# Patient Record
Sex: MALE | Race: BLACK OR AFRICAN AMERICAN | NOT HISPANIC OR LATINO | Employment: UNEMPLOYED | URBAN - METROPOLITAN AREA
[De-identification: names, ages, dates, MRNs, and addresses within clinical notes are randomized per-mention and may not be internally consistent; named-entity substitution may affect disease eponyms.]

---

## 2019-05-10 ENCOUNTER — HOSPITAL ENCOUNTER (EMERGENCY)
Facility: HOSPITAL | Age: 19
Discharge: HOME | End: 2019-05-10
Attending: EMERGENCY MEDICINE
Payer: MEDICAID

## 2019-05-10 VITALS
TEMPERATURE: 99 F | OXYGEN SATURATION: 98 % | BODY MASS INDEX: 35.35 KG/M2 | RESPIRATION RATE: 15 BRPM | WEIGHT: 246.94 LBS | SYSTOLIC BLOOD PRESSURE: 128 MMHG | HEIGHT: 70 IN | DIASTOLIC BLOOD PRESSURE: 65 MMHG | HEART RATE: 81 BPM

## 2019-05-10 DIAGNOSIS — J06.9 URTI (ACUTE UPPER RESPIRATORY INFECTION): ICD-10-CM

## 2019-05-10 DIAGNOSIS — R50.9 FEVER, UNSPECIFIED FEVER CAUSE: Primary | ICD-10-CM

## 2019-05-10 DIAGNOSIS — R55 SYNCOPE, UNSPECIFIED SYNCOPE TYPE: ICD-10-CM

## 2019-05-10 DIAGNOSIS — M67.352 TRANSIENT SYNOVITIS OF HIP, LEFT: ICD-10-CM

## 2019-05-10 LAB
ALBUMIN SERPL BCP-MCNC: 3.9 G/DL (ref 3.5–5.2)
ALP SERPL-CCNC: 74 U/L (ref 55–135)
ALT SERPL W/O P-5'-P-CCNC: 23 U/L (ref 10–44)
ANION GAP SERPL CALC-SCNC: 8 MMOL/L (ref 8–16)
AST SERPL-CCNC: 13 U/L (ref 10–40)
BASOPHILS # BLD AUTO: 0.02 K/UL (ref 0–0.2)
BASOPHILS NFR BLD: 0.2 % (ref 0–1.9)
BILIRUB SERPL-MCNC: 1.2 MG/DL (ref 0.1–1)
BILIRUB UR QL STRIP: NEGATIVE
BUN SERPL-MCNC: 8 MG/DL (ref 6–20)
CALCIUM SERPL-MCNC: 9.2 MG/DL (ref 8.7–10.5)
CHLORIDE SERPL-SCNC: 103 MMOL/L (ref 95–110)
CLARITY UR REFRACT.AUTO: CLEAR
CO2 SERPL-SCNC: 27 MMOL/L (ref 23–29)
COLOR UR AUTO: YELLOW
CREAT SERPL-MCNC: 1.1 MG/DL (ref 0.5–1.4)
CRP SERPL-MCNC: 67.8 MG/L (ref 0–8.2)
DIFFERENTIAL METHOD: ABNORMAL
EOSINOPHIL # BLD AUTO: 0.3 K/UL (ref 0–0.5)
EOSINOPHIL NFR BLD: 2.6 % (ref 0–8)
ERYTHROCYTE [DISTWIDTH] IN BLOOD BY AUTOMATED COUNT: 13.2 % (ref 11.5–14.5)
ERYTHROCYTE [SEDIMENTATION RATE] IN BLOOD BY WESTERGREN METHOD: 36 MM/HR (ref 0–23)
EST. GFR  (AFRICAN AMERICAN): >60 ML/MIN/1.73 M^2
EST. GFR  (NON AFRICAN AMERICAN): >60 ML/MIN/1.73 M^2
GLUCOSE SERPL-MCNC: 101 MG/DL (ref 70–110)
GLUCOSE UR QL STRIP: NEGATIVE
HCT VFR BLD AUTO: 45.8 % (ref 40–54)
HGB BLD-MCNC: 15.4 G/DL (ref 14–18)
HGB UR QL STRIP: NEGATIVE
KETONES UR QL STRIP: NEGATIVE
LACTATE SERPL-SCNC: 0.9 MMOL/L (ref 0.5–2.2)
LEUKOCYTE ESTERASE UR QL STRIP: NEGATIVE
LYMPHOCYTES # BLD AUTO: 1 K/UL (ref 1–4.8)
LYMPHOCYTES NFR BLD: 7.6 % (ref 18–48)
MCH RBC QN AUTO: 27.8 PG (ref 27–31)
MCHC RBC AUTO-ENTMCNC: 33.6 G/DL (ref 32–36)
MCV RBC AUTO: 83 FL (ref 82–98)
MONOCYTES # BLD AUTO: 0.9 K/UL (ref 0.3–1)
MONOCYTES NFR BLD: 6.6 % (ref 4–15)
NEUTROPHILS # BLD AUTO: 10.7 K/UL (ref 1.8–7.7)
NEUTROPHILS NFR BLD: 82.8 % (ref 38–73)
NITRITE UR QL STRIP: NEGATIVE
PH UR STRIP: 7 [PH] (ref 5–8)
PLATELET # BLD AUTO: 219 K/UL (ref 150–350)
PMV BLD AUTO: 9.8 FL (ref 9.2–12.9)
POTASSIUM SERPL-SCNC: 3.7 MMOL/L (ref 3.5–5.1)
PROCALCITONIN SERPL IA-MCNC: 0.15 NG/ML
PROT SERPL-MCNC: 7.4 G/DL (ref 6–8.4)
PROT UR QL STRIP: NEGATIVE
RBC # BLD AUTO: 5.53 M/UL (ref 4.6–6.2)
SODIUM SERPL-SCNC: 138 MMOL/L (ref 136–145)
SP GR UR STRIP: 1.01 (ref 1–1.03)
URN SPEC COLLECT METH UR: NORMAL
UROBILINOGEN UR STRIP-ACNC: <2 EU/DL
WBC # BLD AUTO: 12.94 K/UL (ref 3.9–12.7)

## 2019-05-10 PROCEDURE — 63600175 PHARM REV CODE 636 W HCPCS: Mod: ER | Performed by: EMERGENCY MEDICINE

## 2019-05-10 PROCEDURE — 96367 TX/PROPH/DG ADDL SEQ IV INF: CPT | Mod: ER

## 2019-05-10 PROCEDURE — A9585 GADOBUTROL INJECTION: HCPCS | Mod: ER | Performed by: EMERGENCY MEDICINE

## 2019-05-10 PROCEDURE — 96366 THER/PROPH/DIAG IV INF ADDON: CPT | Mod: ER

## 2019-05-10 PROCEDURE — 25000003 PHARM REV CODE 250: Mod: ER | Performed by: EMERGENCY MEDICINE

## 2019-05-10 PROCEDURE — 86140 C-REACTIVE PROTEIN: CPT | Mod: ER

## 2019-05-10 PROCEDURE — 96375 TX/PRO/DX INJ NEW DRUG ADDON: CPT | Mod: ER

## 2019-05-10 PROCEDURE — 87040 BLOOD CULTURE FOR BACTERIA: CPT

## 2019-05-10 PROCEDURE — 80053 COMPREHEN METABOLIC PANEL: CPT | Mod: ER

## 2019-05-10 PROCEDURE — 85652 RBC SED RATE AUTOMATED: CPT

## 2019-05-10 PROCEDURE — 81003 URINALYSIS AUTO W/O SCOPE: CPT | Mod: ER

## 2019-05-10 PROCEDURE — 85025 COMPLETE CBC W/AUTO DIFF WBC: CPT | Mod: ER

## 2019-05-10 PROCEDURE — 83605 ASSAY OF LACTIC ACID: CPT | Mod: ER

## 2019-05-10 PROCEDURE — 25500020 PHARM REV CODE 255: Mod: ER | Performed by: EMERGENCY MEDICINE

## 2019-05-10 PROCEDURE — 84145 PROCALCITONIN (PCT): CPT | Mod: ER

## 2019-05-10 PROCEDURE — 99285 EMERGENCY DEPT VISIT HI MDM: CPT | Mod: 25,ER

## 2019-05-10 PROCEDURE — 96361 HYDRATE IV INFUSION ADD-ON: CPT | Mod: ER

## 2019-05-10 PROCEDURE — 96365 THER/PROPH/DIAG IV INF INIT: CPT | Mod: ER

## 2019-05-10 RX ORDER — SERTRALINE HYDROCHLORIDE 50 MG/1
50 TABLET, FILM COATED ORAL DAILY
COMMUNITY

## 2019-05-10 RX ORDER — KETOROLAC TROMETHAMINE 30 MG/ML
15 INJECTION, SOLUTION INTRAMUSCULAR; INTRAVENOUS
Status: COMPLETED | OUTPATIENT
Start: 2019-05-10 | End: 2019-05-10

## 2019-05-10 RX ORDER — VANCOMYCIN HCL IN 5 % DEXTROSE 1G/250ML
3000 PLASTIC BAG, INJECTION (ML) INTRAVENOUS
Status: COMPLETED | OUTPATIENT
Start: 2019-05-10 | End: 2019-05-10

## 2019-05-10 RX ORDER — TRAZODONE HYDROCHLORIDE 100 MG/1
100 TABLET ORAL NIGHTLY
COMMUNITY

## 2019-05-10 RX ORDER — GADOBUTROL 604.72 MG/ML
10 INJECTION INTRAVENOUS
Status: COMPLETED | OUTPATIENT
Start: 2019-05-10 | End: 2019-05-10

## 2019-05-10 RX ORDER — ACETAMINOPHEN 500 MG
1000 TABLET ORAL
Status: COMPLETED | OUTPATIENT
Start: 2019-05-10 | End: 2019-05-10

## 2019-05-10 RX ORDER — VANCOMYCIN HCL IN 5 % DEXTROSE 1G/250ML
2000 PLASTIC BAG, INJECTION (ML) INTRAVENOUS
Status: DISCONTINUED | OUTPATIENT
Start: 2019-05-10 | End: 2019-05-10

## 2019-05-10 RX ADMIN — GADOBUTROL 10 ML: 604.72 INJECTION INTRAVENOUS at 10:05

## 2019-05-10 RX ADMIN — VANCOMYCIN HYDROCHLORIDE 3000 MG: 1 INJECTION, POWDER, FOR SOLUTION INTRAVENOUS at 07:05

## 2019-05-10 RX ADMIN — CEFTRIAXONE 2 G: 2 INJECTION, SOLUTION INTRAVENOUS at 06:05

## 2019-05-10 RX ADMIN — SODIUM CHLORIDE 3360 ML: 0.9 INJECTION, SOLUTION INTRAVENOUS at 06:05

## 2019-05-10 RX ADMIN — VANCOMYCIN HYDROCHLORIDE 3000 MG: 1 INJECTION, POWDER, FOR SOLUTION INTRAVENOUS at 10:05

## 2019-05-10 RX ADMIN — ACETAMINOPHEN 1000 MG: 500 TABLET ORAL at 06:05

## 2019-05-10 RX ADMIN — KETOROLAC TROMETHAMINE 15 MG: 30 INJECTION, SOLUTION INTRAMUSCULAR; INTRAVENOUS at 11:05

## 2019-05-10 NOTE — ED NOTES
Pt resting in stretcher, in NAD. Pt denies L hip pain at this time. Pt verbalizes improvement since arrival to ED. Pt reports pain 3/10 on numeric scale.

## 2019-05-10 NOTE — ED NOTES
Pt in NAD, VSS, Resp e/u. Family at bedside. AAO x4. Stretcher locked in lowest position. Side rails up x2. Call bell within reach. Will continue to monitor.

## 2019-05-10 NOTE — ED PROVIDER NOTES
Encounter Date: 5/10/2019       History     Chief Complaint   Patient presents with    Loss of Consciousness     The patient is a 19 year old male, who presents today with complaints of syncope and left hip pain.  Patient states that he has been dealing with upper respiratory tract infection symptoms for the past few days including cough, rhinorrhea, congestion, sneezing.  Today he woke up subjective fever, left hip pain, and lightheadedness.  States he you walk to the bathroom.  He was grabbing a hold of the walls due to the lightheadedness.  He urinated, and then had a syncopal episode.  In triage, patient was noted to be febrile and tachycardic.  Patient states that he woke up today with left hip pain. He went to bed without any hip pain. He states it was hard to walk due to the hip pain this morning.  Denies trauma, skin changes, back pain, and all other symptoms.  Patient denies known history of sexually transmitted diseases including gonorrhea.  Denies IV drug use.        Review of patient's allergies indicates:   Allergen Reactions    Iodine and iodide containing products      Past Medical History:   Diagnosis Date    Anxiety     Depression     Torsion of testicle      Past Surgical History:   Procedure Laterality Date    testicle removed       History reviewed. No pertinent family history.  Social History     Tobacco Use    Smoking status: Never Smoker    Smokeless tobacco: Never Used   Substance Use Topics    Alcohol use: No    Drug use: No     Review of Systems   Constitutional: Positive for chills and fever. Negative for diaphoresis.   HENT: Positive for congestion, rhinorrhea and sneezing. Negative for ear discharge, ear pain, facial swelling, sinus pressure, sinus pain and sore throat.    Eyes: Negative for pain, redness and visual disturbance.   Respiratory: Positive for cough. Negative for shortness of breath.    Cardiovascular: Negative for chest pain, palpitations and leg swelling.    Gastrointestinal: Negative for abdominal distention, abdominal pain, constipation, diarrhea, nausea and vomiting.   Genitourinary: Negative for discharge, dysuria, flank pain, hematuria, scrotal swelling and testicular pain.   Musculoskeletal: Positive for arthralgias (L hip pain) and gait problem. Negative for back pain, neck pain and neck stiffness.   Skin: Negative for rash and wound.   Neurological: Negative for seizures, syncope and headaches.   All other systems reviewed and are negative.      Physical Exam     Initial Vitals [05/10/19 0538]   BP Pulse Resp Temp SpO2   138/71 104 20 (!) 100.5 °F (38.1 °C) 99 %      MAP       --         Physical Exam    Nursing note and vitals reviewed.  Constitutional: He appears well-developed and well-nourished. No distress.   HENT:   Head: Normocephalic and atraumatic.   Mouth/Throat: Oropharynx is clear and moist.   Eyes: Conjunctivae and EOM are normal. Pupils are equal, round, and reactive to light.   Neck: Neck supple. No tracheal deviation present.   No nuchal rigidity   Cardiovascular: Normal rate, regular rhythm, normal heart sounds and intact distal pulses.   Pulmonary/Chest: Breath sounds normal. No respiratory distress.   Abdominal: Soft. He exhibits no distension. There is no tenderness. There is no rebound and no guarding.   Genitourinary:   Genitourinary Comments: No CVA tenderness   Musculoskeletal: Normal range of motion. He exhibits no edema or tenderness.   Mild reduction in left hip range of motion; patient has full passive range of motion of left hip, however patient does grimace; no obvious deformity; no warmth; no erythema    No midline spinal tenderness   Neurological: He is alert and oriented to person, place, and time. GCS score is 15. GCS eye subscore is 4. GCS verbal subscore is 5. GCS motor subscore is 6.   No focal deficits; negative Brudzinski's, negative Kernig's   Skin: Skin is warm. No rash noted. No erythema.   Psychiatric: He has a normal  mood and affect. His behavior is normal.         ED Course   Procedures  Labs Reviewed   CBC W/ AUTO DIFFERENTIAL - Abnormal; Notable for the following components:       Result Value    WBC 12.94 (*)     Gran # (ANC) 10.7 (*)     Gran% 82.8 (*)     Lymph% 7.6 (*)     All other components within normal limits   COMPREHENSIVE METABOLIC PANEL - Abnormal; Notable for the following components:    Total Bilirubin 1.2 (*)     All other components within normal limits   C-REACTIVE PROTEIN - Abnormal; Notable for the following components:    CRP 67.8 (*)     All other components within normal limits   CULTURE, BLOOD   CULTURE, BLOOD   LACTIC ACID, PLASMA   URINALYSIS, REFLEX TO URINE CULTURE    Narrative:     Preferred Collection Type->Urine, Clean Catch   PROCALCITONIN   C-REACTIVE PROTEIN   SEDIMENTATION RATE   SEDIMENTATION RATE     Results for orders placed or performed during the hospital encounter of 05/10/19   CBC auto differential   Result Value Ref Range    WBC 12.94 (H) 3.90 - 12.70 K/uL    RBC 5.53 4.60 - 6.20 M/uL    Hemoglobin 15.4 14.0 - 18.0 g/dL    Hematocrit 45.8 40.0 - 54.0 %    Mean Corpuscular Volume 83 82 - 98 fL    Mean Corpuscular Hemoglobin 27.8 27.0 - 31.0 pg    Mean Corpuscular Hemoglobin Conc 33.6 32.0 - 36.0 g/dL    RDW 13.2 11.5 - 14.5 %    Platelets 219 150 - 350 K/uL    MPV 9.8 9.2 - 12.9 fL    Gran # (ANC) 10.7 (H) 1.8 - 7.7 K/uL    Lymph # 1.0 1.0 - 4.8 K/uL    Mono # 0.9 0.3 - 1.0 K/uL    Eos # 0.3 0.0 - 0.5 K/uL    Baso # 0.02 0.00 - 0.20 K/uL    Gran% 82.8 (H) 38.0 - 73.0 %    Lymph% 7.6 (L) 18.0 - 48.0 %    Mono% 6.6 4.0 - 15.0 %    Eosinophil% 2.6 0.0 - 8.0 %    Basophil% 0.2 0.0 - 1.9 %    Differential Method Automated    Comprehensive metabolic panel   Result Value Ref Range    Sodium 138 136 - 145 mmol/L    Potassium 3.7 3.5 - 5.1 mmol/L    Chloride 103 95 - 110 mmol/L    CO2 27 23 - 29 mmol/L    Glucose 101 70 - 110 mg/dL    BUN, Bld 8 6 - 20 mg/dL    Creatinine 1.1 0.5 - 1.4 mg/dL     Calcium 9.2 8.7 - 10.5 mg/dL    Total Protein 7.4 6.0 - 8.4 g/dL    Albumin 3.9 3.5 - 5.2 g/dL    Total Bilirubin 1.2 (H) 0.1 - 1.0 mg/dL    Alkaline Phosphatase 74 55 - 135 U/L    AST 13 10 - 40 U/L    ALT 23 10 - 44 U/L    Anion Gap 8 8 - 16 mmol/L    eGFR if African American >60.0 >60 mL/min/1.73 m^2    eGFR if non African American >60.0 >60 mL/min/1.73 m^2   Lactic acid, plasma   Result Value Ref Range    Lactate (Lactic Acid) 0.9 0.5 - 2.2 mmol/L   Urinalysis, Reflex to Urine Culture Urine, Clean Catch   Result Value Ref Range    Specimen UA Urine, Clean Catch     Color, UA Yellow Yellow, Straw, Haylee    Appearance, UA Clear Clear    pH, UA 7.0 5.0 - 8.0    Specific Gravity, UA 1.010 1.005 - 1.030    Protein, UA Negative Negative    Glucose, UA Negative Negative    Ketones, UA Negative Negative    Bilirubin (UA) Negative Negative    Occult Blood UA Negative Negative    Nitrite, UA Negative Negative    Urobilinogen, UA <2.0 <2.0 EU/dL    Leukocytes, UA Negative Negative   Procalcitonin   Result Value Ref Range    Procalcitonin 0.15 <0.25 ng/mL   C-reactive protein   Result Value Ref Range    CRP 67.8 (H) 0.0 - 8.2 mg/L            Imaging Results          X-Ray Hip 2 View Left (Final result)  Result time 05/10/19 07:49:32    Final result by ABRAN Peña Sr., MD (05/10/19 07:49:32)                 Impression:      1. The left hip is normal in appearance.  2. There is a 13 mm oval shaped sclerotic area projected over the right femoral neck.  This is characteristic of a bone island.      Electronically signed by: Sal Peña MD  Date:    05/10/2019  Time:    07:49             Narrative:    EXAMINATION:  XR HIP 2 VIEW LEFT    CLINICAL HISTORY:  left hip pain;    COMPARISON:  None    FINDINGS:  There is no fracture. There is no dislocation.  The joint space of the hips is normal in appearance.  There is a 13 mm oval shaped sclerotic area projected over the right femoral neck.                                X-Ray Chest AP Portable (Final result)  Result time 05/10/19 07:54:23    Final result by ABRAN Peña Sr., MD (05/10/19 07:54:23)                 Impression:      Normal study.      Electronically signed by: Sal Peña MD  Date:    05/10/2019  Time:    07:54             Narrative:    EXAMINATION:  XR CHEST AP PORTABLE    CLINICAL HISTORY:  SIRS;    COMPARISON:  None    FINDINGS:  The size of the heart is normal. The lungs are clear. There is no pneumothorax.  The costophrenic angles are sharp.                              Reactive arthritis vs septic joint; consuntled orthopedics for recommendations. Dr. Díaz advised MRI w and w/o of left hip to help guide next decision. Vanc and Rocephin have already been given. ESR pending (send out).  Patient at bedside stating that he does feel better.  Patient was able to stand and ambulate to bathroom.  Still tachycardic.  MRI ordered.  Will have to wait until 8:30 a.m. when MRI team gets here.    10:51 AM: MRI not suggestive of septic arthritis. Discussed MRI with Dr. Díaz who advised outpatient treatment for reactive arthritis/synovitis. Vancomycin stopped. Toradol ordered    Medications   ketorolac injection 15 mg (has no administration in time range)   sodium chloride 0.9% bolus 3,360 mL (0 mL/kg × 112 kg Intravenous Stopped 5/10/19 0702)   cefTRIAXone (ROCEPHIN) 2 g in dextrose 5 % 50 mL IVPB (0 g Intravenous Stopped 5/10/19 0738)   acetaminophen tablet 1,000 mg (1,000 mg Oral Given 5/10/19 0636)   vancomycin in dextrose 5 % 1 gram/250 mL IVPB 3,000 mg (3,000 mg Intravenous New Bag 5/10/19 1038)   gadobutrol (GADAVIST) injection 10 mL (10 mLs Intravenous Given 5/10/19 1000)     Vitals:    05/10/19 0702 05/10/19 0732 05/10/19 0904 05/10/19 0911   BP: (!) 146/77 (!) 150/67 (!) 143/69    BP Location:  Right arm     Patient Position:  Lying     Pulse:  98 86 92   Resp:  16 18 18   Temp:    99.3 °F (37.4 °C)   TempSrc:       SpO2:  97% 96% 96%   Weight:        Height:                Medical Decision Making:   Patient had been experiencing upper respiratory tract infections symptoms for the past couple of days now complaining left hip pain; differential: septic arthritis versus transient synovitis; physical exam not highly suggestive of septic arthritis; patient had only mildly decreased range of motion, however fever, mildly elevated white blood cell count, and elevated CRP concerning for possible septic arthritis; at this time antibiotics vancomycin and Rocephin were ordered and case was discussed with Orthopedics; orthopedics advised MRI to see if patient had a joint effusion that warranted aspiration; no joint effusion or any other abnormalities seen on MRI; orthopedics, Dr. Díaz, at that time recommended treatment outpatient for reactive arthritis/synovitis with NSAIDS.  Patient advised to continue over-the-counter upper respiratory tract infectious medications; advised NSAIDs for hip pain; advised patient to drink plenty of fluids; no high risk features associated with syncope to suggest cardiovascular cause; Vitals signs stable at time of discharge after IVf and antipyretics; patient well appearing at time of discharge; PCP follow-up encouraged; ER return precautions provided                      Clinical Impression:   Final diagnoses:  [R50.9] Fever, unspecified fever cause (Primary)  [M67.352] Transient synovitis of hip, left  [R55] Syncope, unspecified syncope type  [J06.9] URTI (acute upper respiratory infection)                             Isaías Holland MD  05/10/19 1054

## 2019-05-10 NOTE — ED NOTES
Pt in NAD, VSS, Resp e/u. Family at bedside. NSR on monitor. AAO x4. Stretcher locked in lowest position. Side rails up x2. Call bell within reach. Will continue to monitor.

## 2019-05-10 NOTE — ED NOTES
Pt in NAD, VSS, Resp e/u. Stretcher locked in lowest position. Side rails up x2. Call bell within reach. Will continue to monitor.

## 2019-05-10 NOTE — ED NOTES
Pt in NAD, Sinus tach on monitor, Resp e/u. Family at bedside. AAO x4. Stretcher locked in lowest position. Pt reports feeling improvement from dizziness. Call bell within reach. Will continue to monitor.

## 2019-05-15 LAB
BACTERIA BLD CULT: NORMAL
BACTERIA BLD CULT: NORMAL

## 2021-01-02 ENCOUNTER — HOSPITAL ENCOUNTER (EMERGENCY)
Facility: HOSPITAL | Age: 21
Discharge: HOME | End: 2021-01-02
Attending: EMERGENCY MEDICINE
Payer: MEDICAID

## 2021-01-02 VITALS
TEMPERATURE: 99 F | HEIGHT: 70 IN | SYSTOLIC BLOOD PRESSURE: 156 MMHG | DIASTOLIC BLOOD PRESSURE: 76 MMHG | BODY MASS INDEX: 42.72 KG/M2 | RESPIRATION RATE: 20 BRPM | HEART RATE: 95 BPM | OXYGEN SATURATION: 95 % | WEIGHT: 298.38 LBS

## 2021-01-02 DIAGNOSIS — J06.9 ACUTE UPPER RESPIRATORY INFECTION: Primary | ICD-10-CM

## 2021-01-02 LAB
INFLUENZA A, MOLECULAR: NEGATIVE
INFLUENZA B, MOLECULAR: NEGATIVE
SARS-COV-2 RDRP RESP QL NAA+PROBE: NEGATIVE
SPECIMEN SOURCE: NORMAL

## 2021-01-02 PROCEDURE — 87502 INFLUENZA DNA AMP PROBE: CPT | Mod: ER

## 2021-01-02 PROCEDURE — U0002 COVID-19 LAB TEST NON-CDC: HCPCS | Mod: ER

## 2021-01-02 PROCEDURE — 99282 EMERGENCY DEPT VISIT SF MDM: CPT | Mod: ER

## 2025-05-01 ENCOUNTER — HOSPITAL ENCOUNTER (EMERGENCY)
Facility: HOSPITAL | Age: 25
Discharge: HOME | End: 2025-05-01
Attending: EMERGENCY MEDICINE

## 2025-05-01 VITALS
TEMPERATURE: 99 F | HEART RATE: 108 BPM | DIASTOLIC BLOOD PRESSURE: 74 MMHG | WEIGHT: 315 LBS | SYSTOLIC BLOOD PRESSURE: 135 MMHG | BODY MASS INDEX: 45.1 KG/M2 | OXYGEN SATURATION: 97 % | RESPIRATION RATE: 16 BRPM | HEIGHT: 70 IN

## 2025-05-01 DIAGNOSIS — M25.572 LEFT ANKLE PAIN: ICD-10-CM

## 2025-05-01 DIAGNOSIS — S93.402A SPRAIN OF LEFT ANKLE, UNSPECIFIED LIGAMENT, INITIAL ENCOUNTER: Primary | ICD-10-CM

## 2025-05-01 PROCEDURE — 25000003 PHARM REV CODE 250: Mod: ER | Performed by: EMERGENCY MEDICINE

## 2025-05-01 PROCEDURE — 99283 EMERGENCY DEPT VISIT LOW MDM: CPT | Mod: 25,ER

## 2025-05-01 RX ORDER — ACETAMINOPHEN 500 MG
1000 TABLET ORAL
Status: COMPLETED | OUTPATIENT
Start: 2025-05-01 | End: 2025-05-01

## 2025-05-01 RX ADMIN — ACETAMINOPHEN 1000 MG: 500 TABLET ORAL at 07:05

## 2025-05-01 NOTE — ED PROVIDER NOTES
Encounter Date: 5/1/2025       History     Chief Complaint   Patient presents with    Ankle Pain     L ankle pain after falling down stairs around noon today. Denies pain elsewhere.      24 y/o M with PMH of anxiety, depression here with c/o left ankle pain that started just prior to arrival after he fell while going down the stairs. His left ankle rolled, and he fell to the ground. He has no other injury. Has been having difficulty bearing weight. Denies any numbness/weakness. No prior treatment.     The history is provided by the patient.     Review of patient's allergies indicates:   Allergen Reactions    Iodine and iodide containing products      Past Medical History:   Diagnosis Date    Anxiety     Depression     Torsion of testicle      Past Surgical History:   Procedure Laterality Date    testicle removed       No family history on file.  Social History[1]  Review of Systems   Constitutional:  Negative for chills and fever.   HENT:  Negative for congestion and dental problem.    Eyes:  Negative for pain and visual disturbance.   Respiratory:  Negative for cough and shortness of breath.    Cardiovascular:  Negative for chest pain and palpitations.   Gastrointestinal:  Negative for abdominal pain, diarrhea, nausea and vomiting.   Genitourinary:  Negative for dysuria and flank pain.   Musculoskeletal:  Negative for back pain and neck pain.        Left ankle pain.    Skin:  Negative for rash and wound.   Neurological:  Negative for weakness, numbness and headaches.       Physical Exam     Initial Vitals [05/01/25 1851]   BP Pulse Resp Temp SpO2   135/74 108 16 98.6 °F (37 °C) 97 %      MAP       --         Physical Exam    Constitutional: He appears well-developed and well-nourished. No distress.   HENT:   Head: Normocephalic and atraumatic.   Eyes: EOM are normal. Pupils are equal, round, and reactive to light.   Neck: Neck supple.   Normal range of motion.  Cardiovascular:  Normal rate and regular rhythm.            Pulmonary/Chest: Breath sounds normal. No respiratory distress.   Abdominal: He exhibits no distension. There is no abdominal tenderness.   Musculoskeletal:         General: Tenderness present. Normal range of motion.      Cervical back: Normal range of motion and neck supple.      Comments: There is anterior medial and lateral malleolar tenderness on the left ankle. No deformity. There is pain with ROM. No foot tenderness or deformity.      Neurological: He is alert and oriented to person, place, and time. He has normal strength. No sensory deficit.   Skin: Skin is warm and dry.   Psychiatric: He has a normal mood and affect.         ED Course   Procedures  Labs Reviewed - No data to display         Imaging Results              X-Ray Ankle Complete Left (Final result)  Result time 05/01/25 20:09:39      Final result by Geeta Butler MD (05/01/25 20:09:39)                   Impression:       No acute osseous finding    Finalized on: 5/1/2025 8:09 PM By:  Geeta Butler MD  [facility]# [id number]      2025-05-01 20:11:45.227     [facility]               Narrative:    EXAM: XR ANKLE COMPLETE 3 VIEW LEFT    CLINICAL INDICATION:  Pain    PRIORS:   None    FINDINGS:  No acute fracture-dislocation identified 4 view exam.  Soft tissue edema present.                                         Medications   acetaminophen tablet 1,000 mg (1,000 mg Oral Given 5/1/25 1923)     Medical Decision Making  DDx includes fracture, brittaney, dislocation    Amount and/or Complexity of Data Reviewed  Labs: ordered.  Radiology: ordered and independent interpretation performed. Decision-making details documented in ED Course.     Details: Agree with rads, no acute fracture or dislocation    Risk  OTC drugs.               ED Course as of 05/01/25 2310   Thu May 01, 2025   2035 8:35 PM Reassessment: I reassessed the pt.  The pt is resting comfortably and is NAD.  Pt states their sx have improved. Discussed test results, shared treatment plan,  specific conditions for return, and the need for f/u.  Answered their questions at this time.  Pt understands and agrees to the plan.  The pt has remained hemodynamically stable through ED course and is stable for discharge.    [BA]      ED Course User Index  [BA] Shaka Kee MD                           Clinical Impression:  Final diagnoses:  [M25.572] Left ankle pain  [S93.402A] Sprain of left ankle, unspecified ligament, initial encounter (Primary)          ED Disposition Condition    Discharge Stable          ED Prescriptions    None       Follow-up Information       Follow up With Specialties Details Why Contact Info    Mine Perry MD Family Medicine Schedule an appointment as soon as possible for a visit in 2 days For re-evaluation and further treatment [address] [place] [id number]  [phone]      [facility] - Emergency Dept Emergency Medicine Go today If symptoms worsen, For re-evaluation and further treatment, As needed [id number]  Emergency Department  [facility] [id number] [phone]               [1]   Social History  Tobacco Use    Smoking status: Never    Smokeless tobacco: Never   Substance Use Topics    Alcohol use: Yes     Comment: socially    Drug use: No        Shaka Kee MD  05/01/25 4095